# Patient Record
Sex: FEMALE | Race: WHITE | NOT HISPANIC OR LATINO | Employment: FULL TIME | URBAN - METROPOLITAN AREA
[De-identification: names, ages, dates, MRNs, and addresses within clinical notes are randomized per-mention and may not be internally consistent; named-entity substitution may affect disease eponyms.]

---

## 2017-08-03 ENCOUNTER — OFFICE VISIT (OUTPATIENT)
Dept: FAMILY MEDICINE | Facility: CLINIC | Age: 52
End: 2017-08-03
Payer: COMMERCIAL

## 2017-08-03 VITALS
HEIGHT: 64 IN | BODY MASS INDEX: 28.19 KG/M2 | WEIGHT: 165.13 LBS | DIASTOLIC BLOOD PRESSURE: 76 MMHG | OXYGEN SATURATION: 97 % | TEMPERATURE: 99 F | HEART RATE: 83 BPM | SYSTOLIC BLOOD PRESSURE: 124 MMHG

## 2017-08-03 DIAGNOSIS — Z00.00 ANNUAL PHYSICAL EXAM: ICD-10-CM

## 2017-08-03 DIAGNOSIS — E55.9 VITAMIN D DEFICIENCY: ICD-10-CM

## 2017-08-03 DIAGNOSIS — K58.0 IRRITABLE BOWEL SYNDROME WITH DIARRHEA: Primary | ICD-10-CM

## 2017-08-03 PROCEDURE — 3008F BODY MASS INDEX DOCD: CPT | Mod: S$GLB,,, | Performed by: FAMILY MEDICINE

## 2017-08-03 PROCEDURE — 99999 PR PBB SHADOW E&M-EST. PATIENT-LVL III: CPT | Mod: PBBFAC,,, | Performed by: FAMILY MEDICINE

## 2017-08-03 PROCEDURE — 99214 OFFICE O/P EST MOD 30 MIN: CPT | Mod: S$GLB,,, | Performed by: FAMILY MEDICINE

## 2017-08-03 RX ORDER — ERGOCALCIFEROL 1.25 MG/1
CAPSULE ORAL
Refills: 0 | Status: CANCELLED | OUTPATIENT
Start: 2017-08-03

## 2017-08-03 NOTE — PROGRESS NOTES
Answers for HPI/ROS submitted by the patient on 8/3/2017   Abdominal pain  Chronicity: chronic  Onset: more than 1 year ago  Onset quality: undetermined  Frequency: constantly  Episode duration: 12 months  Progression since onset: gradually worsening  Pain location: RUQ  Pain - numeric: 7/10  Pain quality: dull  anorexia: Yes  arthralgias: Yes  belching: Yes  constipation: Yes  diarrhea: Yes  dysuria: No  fever: No  flatus: Yes  frequency: No  headaches: Yes  hematochezia: Yes  hematuria: No  melena: No  myalgias: No  nausea: No  weight loss: Yes  vomiting: No  Aggravated by: eating  Relieved by: nothing  Pain severity: moderate  Treatments tried: nothing  Improvement on treatment: no relief  abdominal surgery: No  colon cancer: No  Crohn's disease: No  gallstones: No  GERD: No  irritable bowel syndrome: No  kidney stones: No  pancreatitis: No  PUD: Yes  ulcerative colitis: No  UTI: Yes  Answers for HPI/ROS submitted by the patient on 8/3/2017   Abdominal pain  Chronicity: chronic  Onset: more than 1 year ago  Onset quality: undetermined  Frequency: constantly  Episode duration: 12 months  Progression since onset: gradually worsening  Pain location: RUQ  Pain - numeric: 7/10  Pain quality: dull  anorexia: Yes  arthralgias: Yes  belching: Yes  constipation: Yes  diarrhea: Yes  dysuria: No  fever: No  flatus: Yes  frequency: No  headaches: Yes  hematochezia: Yes  hematuria: No  melena: No  myalgias: No  nausea: No  weight loss: Yes  vomiting: No  Aggravated by: eating  Relieved by: nothing  Pain severity: moderate  Treatments tried: nothing  Improvement on treatment: no relief  abdominal surgery: No  colon cancer: No  Crohn's disease: No  gallstones: No  GERD: No  irritable bowel syndrome: No  kidney stones: No  pancreatitis: No  PUD: Yes  ulcerative colitis: No  UTI: Yes

## 2017-08-03 NOTE — PROGRESS NOTES
Subjective:       Patient ID: Ashly Bianchi is a 52 y.o. female.    Chief Complaint: Breast Pain radiating towards back (right) and Discuss Head Pain    Patient presents for concerns about RUQ abdominal discomfort that she has had for years and it radiates to the back. She has no nausea or vomiting. She states she has it now, but she just finished eating. She states she is not sure whether it starts in the front or the back. She has no changes in her urine. She has had her colonoscopy with Dr. Mcgregor and it only showed diverticulosis. She had an EGD as well and was told that she has an ulcer. She states eating worsens this discomfort. She states it is not unbearable. She states it is a constant discomfort that is always there. She states her bowel movements are always soft or she is constipated. She states her bowel movements are soft now. She has no nausea or vomiting with this.       Abdominal Pain   This is a chronic problem. The current episode started more than 1 year ago. The onset quality is undetermined. The problem occurs constantly. The most recent episode lasted 12 months. The problem has been gradually worsening. The pain is located in the RUQ. The pain is at a severity of 7/10. The pain is moderate. The quality of the pain is dull. Associated symptoms include anorexia, arthralgias, belching, constipation, diarrhea, flatus, headaches, hematochezia and weight loss. Pertinent negatives include no dysuria, fever, frequency, hematuria, melena, myalgias, nausea or vomiting. The pain is aggravated by eating. The pain is relieved by nothing. She has tried nothing for the symptoms. The treatment provided no relief. Her past medical history is significant for PUD. There is no history of abdominal surgery, colon cancer, Crohn's disease, gallstones, GERD, irritable bowel syndrome, pancreatitis or ulcerative colitis. Patient's medical history includes UTI. Patient's medical history does not include kidney stones.  "    Review of Systems   Constitutional: Positive for weight loss. Negative for fever.   Gastrointestinal: Positive for abdominal pain, anorexia, constipation, diarrhea, flatus and hematochezia. Negative for melena, nausea and vomiting.   Genitourinary: Negative for dysuria, frequency and hematuria.   Musculoskeletal: Positive for arthralgias. Negative for myalgias.   Neurological: Positive for headaches.       Objective:       Vitals:    08/03/17 1439   BP: 124/76   Pulse: 83   Temp: 98.7 °F (37.1 °C)   TempSrc: Oral   SpO2: 97%   Weight: 74.9 kg (165 lb 2 oz)   Height: 5' 4" (1.626 m)       Physical Exam   Constitutional: She is oriented to person, place, and time. She appears well-developed and well-nourished. No distress.   HENT:   Head: Normocephalic and atraumatic.   Neck: Normal range of motion. Neck supple.   Cardiovascular: Normal rate, regular rhythm and normal heart sounds.  Exam reveals no gallop and no friction rub.    No murmur heard.  Pulmonary/Chest: Effort normal and breath sounds normal. No respiratory distress. She has no wheezes. She has no rales.   Abdominal: Soft. Bowel sounds are normal. She exhibits no distension and no mass. There is no tenderness. There is no rebound and no guarding. No hernia.   Neurological: She is alert and oriented to person, place, and time.   Skin: She is not diaphoretic.   Psychiatric: She has a normal mood and affect.       Assessment:       1. Irritable bowel syndrome with diarrhea    2. Annual physical exam    3. Vitamin D deficiency        Plan:       Ashly was seen today for breast pain radiating towards back (right) and discuss head pain.    Diagnoses and all orders for this visit:    Irritable bowel syndrome with diarrhea  -     eluxadoline (VIBERZI) 100 mg Tab; Take 100 mg by mouth 2 (two) times daily.  Due to the chronicity of her discomfort and her normal labs and normal colonoscopy will empirically treat for IBS.     Annual physical exam  -     " Comprehensive metabolic panel; Future  -     Lipid panel; Future  -     TSH; Future  -     CBC auto differential; Future  She is also due for routine labs    Vitamin D deficiency  -     Vitamin D; Future    Other orders  -     Cancel: ergocalciferol (VITAMIN D2) 50,000 unit Cap;

## 2018-11-20 ENCOUNTER — OFFICE VISIT (OUTPATIENT)
Dept: FAMILY MEDICINE | Facility: CLINIC | Age: 53
End: 2018-11-20
Payer: COMMERCIAL

## 2018-11-20 ENCOUNTER — OFFICE VISIT (OUTPATIENT)
Dept: CARDIOLOGY | Facility: CLINIC | Age: 53
End: 2018-11-20
Payer: COMMERCIAL

## 2018-11-20 ENCOUNTER — HOSPITAL ENCOUNTER (OUTPATIENT)
Dept: CARDIOLOGY | Facility: HOSPITAL | Age: 53
Discharge: HOME | End: 2018-11-20
Attending: INTERNAL MEDICINE
Payer: COMMERCIAL

## 2018-11-20 VITALS — HEIGHT: 64 IN | WEIGHT: 163 LBS | HEART RATE: 70 BPM | BODY MASS INDEX: 27.83 KG/M2

## 2018-11-20 VITALS
HEIGHT: 64 IN | DIASTOLIC BLOOD PRESSURE: 85 MMHG | OXYGEN SATURATION: 98 % | WEIGHT: 163.13 LBS | SYSTOLIC BLOOD PRESSURE: 134 MMHG | HEART RATE: 83 BPM | BODY MASS INDEX: 27.85 KG/M2

## 2018-11-20 VITALS
HEART RATE: 82 BPM | BODY MASS INDEX: 27.96 KG/M2 | TEMPERATURE: 98 F | HEIGHT: 64 IN | SYSTOLIC BLOOD PRESSURE: 110 MMHG | OXYGEN SATURATION: 99 % | WEIGHT: 163.81 LBS | DIASTOLIC BLOOD PRESSURE: 70 MMHG

## 2018-11-20 DIAGNOSIS — M79.605 PAIN IN BOTH LOWER EXTREMITIES: ICD-10-CM

## 2018-11-20 DIAGNOSIS — R07.89 CHEST PAIN, ATYPICAL: ICD-10-CM

## 2018-11-20 DIAGNOSIS — R00.2 PALPITATIONS: ICD-10-CM

## 2018-11-20 DIAGNOSIS — R07.89 CHEST DISCOMFORT: ICD-10-CM

## 2018-11-20 DIAGNOSIS — R53.83 FATIGUE, UNSPECIFIED TYPE: ICD-10-CM

## 2018-11-20 DIAGNOSIS — M25.562 ACUTE PAIN OF BOTH KNEES: ICD-10-CM

## 2018-11-20 DIAGNOSIS — Z72.0 TOBACCO ABUSE: Primary | ICD-10-CM

## 2018-11-20 DIAGNOSIS — E89.0 HISTORY OF PARTIAL THYROIDECTOMY: ICD-10-CM

## 2018-11-20 DIAGNOSIS — R63.1 POLYDIPSIA: ICD-10-CM

## 2018-11-20 DIAGNOSIS — M25.561 ACUTE PAIN OF BOTH KNEES: ICD-10-CM

## 2018-11-20 DIAGNOSIS — E55.9 HYPOVITAMINOSIS D: ICD-10-CM

## 2018-11-20 DIAGNOSIS — I83.813 VARICOSE VEINS OF BOTH LOWER EXTREMITIES WITH PAIN: ICD-10-CM

## 2018-11-20 DIAGNOSIS — M79.604 PAIN IN BOTH LOWER EXTREMITIES: ICD-10-CM

## 2018-11-20 DIAGNOSIS — R94.31 ABNORMAL EKG: ICD-10-CM

## 2018-11-20 LAB
AORTIC ROOT ANNULUS: 2.2 CM
AORTIC VALVE CUSP SEPERATION: 1.64 CM
ASCENDING AORTA: 2.81 CM
AV MEAN GRADIENT: 5.66 MMHG
AV PEAK GRADIENT: 10.63 MMHG
AV VALVE AREA: 1.91 CM2
BSA FOR ECHO PROCEDURE: 1.83 M2
CV ECHO LV RWT: 0.8 CM
CV STRESS BASE HR: 88
DIASTOLIC BLOOD PRESSURE: 82
DOP CALC AO PEAK VEL: 1.63 M/S
DOP CALC AO VTI: 33.51 CM
DOP CALC LVOT AREA: 2.86 CM2
DOP CALC LVOT DIAMETER: 1.91 CM
DOP CALC LVOT STROKE VOLUME: 64.03 CM3
DOP CALCLVOT PEAK VEL VTI: 22.36 CM
E WAVE DECELERATION TIME: 181.03 MSEC
E/A RATIO: 0.75
E/E' RATIO: 8.95
ECHO LV POSTERIOR WALL: 1.23 CM (ref 0.6–1.1)
FRACTIONAL SHORTENING: 35 % (ref 28–44)
INTERVENTRICULAR SEPTUM: 1.02 CM (ref 0.6–1.1)
IVRT: 0.12 MSEC
LA MAJOR: 4.55 CM
LA MINOR: 4.65 CM
LA WIDTH: 3.83 CM
LEFT ATRIUM SIZE: 3.24 CM
LEFT ATRIUM VOLUME INDEX: 26.5 ML/M2
LEFT ATRIUM VOLUME: 48.51 CM3
LEFT INTERNAL DIMENSION IN SYSTOLE: 2 CM (ref 2.1–4)
LEFT VENTRICLE DIASTOLIC VOLUME INDEX: 20.05 ML/M2
LEFT VENTRICLE DIASTOLIC VOLUME: 36.69 ML
LEFT VENTRICLE MASS INDEX: 55.4 G/M2
LEFT VENTRICLE SYSTOLIC VOLUME INDEX: 6.9 ML/M2
LEFT VENTRICLE SYSTOLIC VOLUME: 12.71 ML
LEFT VENTRICULAR INTERNAL DIMENSION IN DIASTOLE: 3.06 CM (ref 3.5–6)
LEFT VENTRICULAR MASS: 101.33 G
LV LATERAL E/E' RATIO: 7.73
LV SEPTAL E/E' RATIO: 10.63
MV PEAK A VEL: 1.14 M/S
MV PEAK E VEL: 0.85 M/S
OHS CV CPX 1 MINUTE RECOVERY HEART RATE: 110 BPM
OHS CV CPX 85 PERCENT MAX PREDICTED HEART RATE MALE: 135
OHS CV CPX ESTIMATED METS: 8
OHS CV CPX MAX PREDICTED HEART RATE: 159
OHS CV CPX PATIENT IS FEMALE: 1
OHS CV CPX PATIENT IS MALE: 0
OHS CV CPX PEAK DIASTOLIC BLOOD PRESSURE: 80 MMHG
OHS CV CPX PEAK HEAR RATE: 150
OHS CV CPX PEAK RATE PRESSURE PRODUCT: ABNORMAL
OHS CV CPX PEAK SYSTOLIC BLOOD PRESSURE: 193
OHS CV CPX PERCENT MAX PREDICTED HEART RATE ACHIEVED: 94
OHS CV CPX PERCENT TARGET HEART RATE ACHIEVED: 106.38
OHS CV CPX RATE PRESSURE PRODUCT PRESENTING: ABNORMAL
OHS CV CPX TARGET HEART RATE: 141
PISA TR MAX VEL: 2.5 M/S
PULM VEIN S/D RATIO: 1.68
PV PEAK D VEL: 0.4 M/S
PV PEAK S VEL: 0.67 M/S
PV PEAK VELOCITY: 1.18 CM/S
RA MAJOR: 4.77 CM
RA PRESSURE: 3 MMHG
RA WIDTH: 3.12 CM
RIGHT VENTRICULAR END-DIASTOLIC DIMENSION: 2.61 CM
RV TISSUE DOPPLER FREE WALL SYSTOLIC VELOCITY 1 (APICAL 4 CHAMBER VIEW): 11.14 M/S
SINUS: 2.78 CM
STJ: 2.39 CM
STRESS ECHO POST EXERCISE DUR MIN: 6 MIN
STRESS ECHO POST EXERCISE DUR SEC: 46
SYSTOLIC BLOOD PRESSURE: 134
TDI LATERAL: 0.11
TDI SEPTAL: 0.08
TDI: 0.1
TR MAX PG: 25 MMHG
TRICUSPID ANNULAR PLANE SYSTOLIC EXCURSION: 1.89 CM
TV REST PULMONARY ARTERY PRESSURE: 28 MMHG

## 2018-11-20 PROCEDURE — 93320 DOPPLER ECHO COMPLETE: CPT

## 2018-11-20 PROCEDURE — 93351 STRESS TTE COMPLETE: CPT | Mod: 26,,, | Performed by: INTERNAL MEDICINE

## 2018-11-20 PROCEDURE — 93325 DOPPLER ECHO COLOR FLOW MAPG: CPT | Mod: 26,,, | Performed by: INTERNAL MEDICINE

## 2018-11-20 PROCEDURE — 99999 PR PBB SHADOW E&M-EST. PATIENT-LVL V: CPT | Mod: PBBFAC,,, | Performed by: PHYSICIAN ASSISTANT

## 2018-11-20 PROCEDURE — 93320 DOPPLER ECHO COMPLETE: CPT | Mod: 26,,, | Performed by: INTERNAL MEDICINE

## 2018-11-20 PROCEDURE — 93227 XTRNL ECG REC<48 HR R&I: CPT | Mod: ,,, | Performed by: INTERNAL MEDICINE

## 2018-11-20 PROCEDURE — 3008F BODY MASS INDEX DOCD: CPT | Mod: CPTII,S$GLB,, | Performed by: PHYSICIAN ASSISTANT

## 2018-11-20 PROCEDURE — 93010 ELECTROCARDIOGRAM REPORT: CPT | Mod: S$GLB,,, | Performed by: INTERNAL MEDICINE

## 2018-11-20 PROCEDURE — 99204 OFFICE O/P NEW MOD 45 MIN: CPT | Mod: 25,S$GLB,, | Performed by: INTERNAL MEDICINE

## 2018-11-20 PROCEDURE — 3008F BODY MASS INDEX DOCD: CPT | Mod: CPTII,S$GLB,, | Performed by: INTERNAL MEDICINE

## 2018-11-20 PROCEDURE — 93225 XTRNL ECG REC<48 HRS REC: CPT

## 2018-11-20 PROCEDURE — 99214 OFFICE O/P EST MOD 30 MIN: CPT | Mod: S$GLB,,, | Performed by: PHYSICIAN ASSISTANT

## 2018-11-20 PROCEDURE — 99999 PR PBB SHADOW E&M-EST. PATIENT-LVL III: CPT | Mod: PBBFAC,,, | Performed by: INTERNAL MEDICINE

## 2018-11-20 RX ORDER — ERGOCALCIFEROL 1.25 MG/1
CAPSULE ORAL
Refills: 0 | Status: CANCELLED | OUTPATIENT
Start: 2018-11-20

## 2018-11-20 RX ORDER — MELOXICAM 7.5 MG/1
7.5 TABLET ORAL DAILY
Qty: 30 TABLET | Refills: 1 | Status: SHIPPED | OUTPATIENT
Start: 2018-11-20 | End: 2019-05-30

## 2018-11-20 NOTE — PROGRESS NOTES
Subjective:       Patient ID: Ashly Bianchi is a 53 y.o. female with multiple medical diagnoses as listed in the medical history and problem list that presents for Fatigue and Leg Pain  .    Chief Complaint: Fatigue and Leg Pain      Fatigue   This is a new (had thyroid surgery 5 years ago and told did not need replacement ) problem. The current episode started more than 1 month ago (couple months ). The problem has been gradually worsening. Associated symptoms include fatigue. Pertinent negatives include no anorexia, joint swelling, numbness or rash. 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 She has tried nothing for the symptoms.   Leg Pain    The incident occurred 5 to 7 days ago (she was bare foot on slides and ladder at [employer]; she does wear flats alot ). There was no injury mechanism. The pain is present in the right foot and left foot (both calves ). The quality of the pain is described as aching (with occasionally tingling ). The pain has been intermittent (worse at night ) since onset. Associated symptoms include tingling. Pertinent negatives include no inability to bear weight, loss of motion or numbness. She has tried nothing for the symptoms.   Knee Pain    The incident occurred more than 1 week ago (chronic but worsened after fall in april l>r). Injury mechanism: history of OA, fell made left side worse  The pain is present in the left knee and right knee. The quality of the pain is described as aching. The pain has been intermittent since onset. Associated symptoms include tingling. Pertinent negatives include no inability to bear weight, loss of motion or numbness. Exacerbated by: stairs, crawling  Treatments tried: pennsaid from ortho      Review of Systems   Constitutional: Positive for fatigue.   Gastrointestinal: Negative for anorexia.   Endocrine: Positive for polydipsia. Negative for polyuria.  "  Musculoskeletal: Negative for joint swelling.        Foot bilaterally wears flats generally    Skin: Negative for rash.        Varicose veins    Neurological: Positive for tingling. Negative for numbness.   Psychiatric/Behavioral: Positive for sleep disturbance (waking up earlier ). The patient is nervous/anxious.          PAST MEDICAL HISTORY:  Past Medical History:   Diagnosis Date    Heart murmur        SOCIAL HISTORY:  Social History     Socioeconomic History    Marital status:      Spouse name: Not on file    Number of children: Not on file    Years of education: Not on file    Highest education level: Not on file   Social Needs    Financial resource strain: Not on file    Food insecurity - worry: Not on file    Food insecurity - inability: Not on file    Transportation needs - medical: Not on file    Transportation needs - non-medical: Not on file   Occupational History    Not on file   Tobacco Use    Smoking status: Current Every Day Smoker     Packs/day: 0.50    Smokeless tobacco: Never Used   Substance and Sexual Activity    Alcohol use: No    Drug use: No    Sexual activity: Yes     Partners: Male     Comment: vasectomy   Other Topics Concern    Not on file   Social History Narrative    teacher       ALLERGIES AND MEDICATIONS: updated and reviewed.  Review of patient's allergies indicates:  No Known Allergies  Current Outpatient Medications   Medication Sig Dispense Refill    albuterol sulfate (PROAIR HFA INHL) Inhale into the lungs.      PENNSAID 20 mg/gram /actuation(2 %) sopm APPLY TWO PUMPS TO THE AFFECTED KNEE(S) TWICE DAILY  4    meloxicam (MOBIC) 7.5 MG tablet Take 1 tablet (7.5 mg total) by mouth once daily. 30 tablet 1     No current facility-administered medications for this visit.          Objective:   /70   Pulse 82   Temp 98.2 °F (36.8 °C) (Oral)   Ht 5' 4" (1.626 m)   Wt 74.3 kg (163 lb 12.8 oz)   SpO2 99%   BMI 28.12 kg/m²      Physical Exam "   Constitutional: She is oriented to person, place, and time. No distress.   HENT:   Head: Normocephalic and atraumatic.   Eyes: Conjunctivae and EOM are normal.   Cardiovascular: Normal rate and regular rhythm.   Pulmonary/Chest: Effort normal and breath sounds normal. She has no wheezes.   Musculoskeletal: Normal range of motion.   Neurological: She is alert and oriented to person, place, and time.   Skin: Skin is warm and dry.   Varicose veins bilaterally    Psychiatric: Her mood appears anxious. Her speech is rapid and/or pressured.           Assessment:       1. Tobacco abuse    2. Fatigue, unspecified type    3. History of partial thyroidectomy    4. Hypovitaminosis D    5. Polydipsia    6. Pain in both lower extremities    7. Varicose veins of both lower extremities with pain    8. Acute pain of both knees    9. Chest discomfort    10. Abnormal EKG        Plan:       Tobacco abuse  -     Lipid panel; Future; Expected date: 11/20/2018  -     Complete PFT with bronchodilator; Future  -     X-Ray Chest PA And Lateral; Future; Expected date: 11/20/2018  -     Ambulatory referral to Smoking Cessation Program  -     IN OFFICE EKG 12-LEAD (to [place])  -     Ambulatory referral to Cardiology    Fatigue, unspecified type  -     CBC auto differential; Future; Expected date: 11/20/2018  -     Comprehensive metabolic panel; Future; Expected date: 11/20/2018  -     TSH; Future; Expected date: 11/21/2018  -     Hemoglobin A1c; Future; Expected date: 11/20/2018    History of partial thyroidectomy  -     TSH; Future; Expected date: 11/21/2018  -     T4, free; Future; Expected date: 11/20/2018  -     T3; Future; Expected date: 11/20/2018    Hypovitaminosis D  -     Vitamin D; Future; Expected date: 11/20/2018    Polydipsia  -     Hemoglobin A1c; Future; Expected date: 11/20/2018    Pain in both lower extremities  -     CK; Future; Expected date: 11/20/2018  -     Sedimentation rate; Future; Expected date: 11/20/2018  -      C-reactive protein; Future; Expected date: 11/20/2018    Varicose veins of both lower extremities with pain  -     Ambulatory referral to Vascular Surgery    Acute pain of both knees  -     meloxicam (MOBIC) 7.5 MG tablet; Take 1 tablet (7.5 mg total) by mouth once daily.  Dispense: 30 tablet; Refill: 1    Chest discomfort  -     IN OFFICE EKG 12-LEAD (to [place])  -     Ambulatory referral to Cardiology    Abnormal EKG  -     Ambulatory referral to Cardiology      -will contact with results                     No Follow-up on file.

## 2018-11-20 NOTE — PROGRESS NOTES
Subjective:    Patient ID:  Ashly Bianchi is a 53 y.o. female who presents for evaluation of Palpitations and Abnormal ECG      HPI     Referred by PA  Fatigue   This is a new (had thyroid surgery 5 years ago and told did not need replacement ) problem. The current episode started more than 1 month ago (couple months ). The problem has been gradually worsening. Associated symptoms include fatigue. Pertinent negatives include no anorexia, joint swelling, numbness or rash. 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 She has tried nothing for the symptoms.   Leg Pain    The incident occurred 5 to 7 days ago (she was bare foot on slides and ladder at [facility]; she does wear flats alot ). There was no injury mechanism. The pain is present in the right foot and left foot (both calves ). The quality of the pain is described as aching (with occasionally tingling ). The pain has been intermittent (worse at night ) since onset. Associated symptoms include tingling. Pertinent negatives include no inability to bear weight, loss of motion or numbness. She has tried nothing for the symptoms.   Knee Pain    The incident occurred more than 1 week ago (chronic but worsened after fall in april l>r). Injury mechanism: history of OA, fell made left side worse  The pain is present in the left knee and right knee. The quality of the pain is described as aching. The pain has been intermittent since onset. Associated symptoms include tingling. Pertinent negatives include no inability to bear weight, loss of motion or numbness. Exacerbated by: stairs, crawling  Treatments tried: pennsaid from ortho      Tobacco abuse  -     Lipid panel; Future; Expected date: 11/20/2018  -     Complete PFT with bronchodilator; Future  -     X-Ray Chest PA And Lateral; Future; Expected date: 11/20/2018  -     Ambulatory referral to Smoking Cessation Program  -     IN  OFFICE EKG 12-LEAD (to [place])  -     Ambulatory referral to Cardiology     Fatigue, unspecified type  -     CBC auto differential; Future; Expected date: 11/20/2018  -     Comprehensive metabolic panel; Future; Expected date: 11/20/2018  -     TSH; Future; Expected date: 11/21/2018  -     Hemoglobin A1c; Future; Expected date: 11/20/2018     History of partial thyroidectomy  -     TSH; Future; Expected date: 11/21/2018  -     T4, free; Future; Expected date: 11/20/2018  -     T3; Future; Expected date: 11/20/2018     Hypovitaminosis D  -     Vitamin D; Future; Expected date: 11/20/2018     Polydipsia  -     Hemoglobin A1c; Future; Expected date: 11/20/2018     Pain in both lower extremities  -     CK; Future; Expected date: 11/20/2018  -     Sedimentation rate; Future; Expected date: 11/20/2018  -     C-reactive protein; Future; Expected date: 11/20/2018     Varicose veins of both lower extremities with pain  -     Ambulatory referral to Vascular Surgery     Acute pain of both knees  -     meloxicam (MOBIC) 7.5 MG tablet; Take 1 tablet (7.5 mg total) by mouth once daily.  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   HENT: Negative for ear discharge.    Eyes: Negative for blurred vision.   Respiratory: Negative for hemoptysis.    Endocrine: Negative for polyphagia.   Hematologic/Lymphatic: Negative for adenopathy.   Skin: Negative for color change.   Musculoskeletal: Negative for joint swelling.   Neurological: Negative for brief paralysis.   Psychiatric/Behavioral: Negative for hallucinations.        Objective:    Physical Exam   Constitutional: She is oriented to person, place, and time. She appears well-developed  and well-nourished.   HENT:   Head: Normocephalic and atraumatic.   Eyes: Conjunctivae are normal. Pupils are equal, round, and reactive to light.   Neck: Normal range of motion. Neck supple.   Cardiovascular: Normal rate, normal heart sounds and intact distal pulses.   Pulmonary/Chest: Effort normal and breath sounds normal.   Abdominal: Soft. Bowel sounds are normal.   Musculoskeletal: Normal range of motion.   Neurological: She is alert and oriented to person, place, and time.   Skin: Skin is warm and dry.         Assessment:       1. Palpitations    2. Chest pain, atypical         Plan:       Holter for palpitations  Stress echo for CP and abnormal EKG

## 2018-11-20 NOTE — LETTER
November 20, 2018      [facility]  [address]  [place] [address]           [facility] - Cardiology  [address] [facility] [address]  [place] [address]  Phone: [phone]          Patient: Ashly Bianchi   MR Number: [id number]   YOB: 1965   Date of Visit: 11/20/2018       Dear Yary Hu:    Thank you for referring Ashly Bianchi to me for evaluation. Attached you will find relevant portions of my assessment and plan of care.    If you have questions, please do not hesitate to call me. I look forward to following Ashly Bianchi along with you.    Sincerely,    David Ortega MD    Enclosure  CC:  No Recipients    If you would like to receive this communication electronically, please contact [email or web address] or [phone] to request more information on [facility] access.    For providers and/or their staff who would like to refer a patient to [facility], please contact us through our one-stop-shop provider referral line, [facility], at [phone].    If you feel you have received this communication in error or would no longer like to receive these types of communications, please e-mail [email or web address]

## 2018-11-21 ENCOUNTER — HOSPITAL ENCOUNTER (OUTPATIENT)
Dept: RESPIRATORY THERAPY | Facility: HOSPITAL | Age: 53
Discharge: HOME | End: 2018-11-21
Attending: PHYSICIAN ASSISTANT
Payer: COMMERCIAL

## 2018-11-21 ENCOUNTER — TELEPHONE (OUTPATIENT)
Dept: FAMILY MEDICINE | Facility: CLINIC | Age: 53
End: 2018-11-21

## 2018-11-21 VITALS — RESPIRATION RATE: 18 BRPM | OXYGEN SATURATION: 96 % | HEART RATE: 82 BPM

## 2018-11-21 DIAGNOSIS — E55.9 HYPOVITAMINOSIS D: Primary | ICD-10-CM

## 2018-11-21 DIAGNOSIS — Z72.0 TOBACCO ABUSE: ICD-10-CM

## 2018-11-21 LAB
BRPFT: ABNORMAL
DLCO ADJ PRE: 19.85 ML/(MIN*MMHG) (ref 18.14–29.61)
DLCO SINGLE BREATH LLN: 18.14
DLCO SINGLE BREATH PRE REF: 83.1 %
DLCO SINGLE BREATH REF: 23.88
DLCOC SBVA LLN: 3.31
DLCOC SBVA PRE REF: 114.5 %
DLCOC SBVA REF: 4.81
DLCOC SINGLE BREATH LLN: 18.14
DLCOC SINGLE BREATH PRE REF: 83.1 %
DLCOC SINGLE BREATH REF: 23.88
DLCOVA LLN: 3.31
DLCOVA PRE REF: 114.5 %
DLCOVA PRE: 5.5 ML/(MIN*MMHG*L) (ref 3.31–6.31)
DLCOVA REF: 4.81
DLVAADJ PRE: 5.5 ML/(MIN*MMHG*L) (ref 3.31–6.31)
ERV LLN: 0.91
ERV REF: 0.91
ERVN2 LLN: 0.91
ERVN2 PRE REF: 87.2 %
ERVN2 PRE: 0.79 L (ref 0.91–0.91)
ERVN2 REF: 0.91
FEF 25 75 CHG: -3.5 %
FEF 25 75 LLN: 1.41
FEF 25 75 POST REF: 56.4 %
FEF 25 75 PRE REF: 58.4 %
FEF 25 75 REF: 2.58
FET100 CHG: -1.9 %
FEV1 CHG: -5.1 %
FEV1 FVC CHG: 0.3 %
FEV1 FVC LLN: 69
FEV1 FVC POST REF: 91.8 %
FEV1 FVC PRE REF: 91.5 %
FEV1 FVC REF: 80
FEV1 LLN: 2.08
FEV1 POST REF: 72.1 %
FEV1 PRE REF: 76 %
FEV1 REF: 2.69
FEV6 CHG: -4.5 %
FEV6 LLN: 2.71
FEV6 POST REF: 75.7 %
FEV6 POST: 2.57 L (ref 2.71–4.08)
FEV6 PRE REF: 79.3 %
FEV6 PRE: 2.69 L (ref 2.71–4.08)
FEV6 REF: 3.4
FRCN2 LLN: 1.88
FRCN2 PRE REF: 67.5 %
FRCN2 REF: 2.7
FRCPLETH LLN: 1.88
FRCPLETH REF: 2.7
FVC CHG: -5.4 %
FVC LLN: 2.62
FVC POST REF: 78.2 %
FVC PRE REF: 82.6 %
FVC REF: 3.38
IVC PRE: 2.33 L (ref 2.62–4.14)
IVC SINGLE BREATH LLN: 2.62
IVC SINGLE BREATH PRE REF: 69 %
IVC SINGLE BREATH REF: 3.38
MVV LLN: 85
MVV REF: 101
PEF CHG: -15.8 %
PEF LLN: 4.93
PEF POST REF: 60.3 %
PEF PRE REF: 71.6 %
PEF REF: 6.65
POST FEF 25 75: 1.46 L/S (ref 1.41–3.75)
POST FET 100: 9.66 SEC
POST FEV1 FVC: 73.49 % (ref 68.71–91.46)
POST FEV1: 1.94 L (ref 2.08–3.31)
POST FVC: 2.64 L (ref 2.62–4.14)
POST PEF: 4.01 L/S (ref 4.93–8.37)
PRE DLCO: 19.85 ML/(MIN*MMHG) (ref 18.14–29.61)
PRE FEF 25 75: 1.51 L/S (ref 1.41–3.75)
PRE FET 100: 9.86 SEC
PRE FEV1 FVC: 73.27 % (ref 68.71–91.46)
PRE FEV1: 2.05 L (ref 2.08–3.31)
PRE FRC N2: 1.82 L
PRE FVC: 2.79 L (ref 2.62–4.14)
PRE PEF: 4.76 L/S (ref 4.93–8.37)
RAW LLN: 3.06
RAW REF: 3.06
RV LLN: 1.22
RV REF: 1.8
RVN2 LLN: 1.22
RVN2 PRE REF: 36.7 %
RVN2 PRE: 0.66 L (ref 1.22–2.37)
RVN2 REF: 1.8
RVN2TLCN2 LLN: 27
RVN2TLCN2 PRE REF: 51.7 %
RVN2TLCN2 PRE: 19.1 % (ref 27.39–46.57)
RVN2TLCN2 REF: 37
RVTLC LLN: 27
RVTLC REF: 37
TLC LLN: 3.98
TLC REF: 4.97
TLCN2 LLN: 3.98
TLCN2 PRE REF: 69.5 %
TLCN2 PRE: 3.46 L (ref 3.98–5.96)
TLCN2 REF: 4.97
VA PRE: 3.61 L (ref 4.82–4.82)
VA SINGLE BREATH LLN: 4.82
VA SINGLE BREATH PRE REF: 74.9 %
VA SINGLE BREATH REF: 4.82
VC LLN: 2.62
VC REF: 3.38
VCMAXN2 LLN: 2.62
VCMAXN2 PRE REF: 82.6 %
VCMAXN2 PRE: 2.79 L (ref 2.62–4.14)
VCMAXN2 REF: 3.38

## 2018-11-21 PROCEDURE — 94060 EVALUATION OF WHEEZING: CPT

## 2018-11-21 PROCEDURE — 25000242 PHARM REV CODE 250 ALT 637 W/ HCPCS: Performed by: PHYSICIAN ASSISTANT

## 2018-11-21 RX ORDER — ERGOCALCIFEROL 1.25 MG/1
CAPSULE ORAL
Qty: 24 CAPSULE | Refills: 1 | Status: SHIPPED | OUTPATIENT
Start: 2018-11-21 | End: 2019-05-30 | Stop reason: ALTCHOICE

## 2018-11-21 RX ORDER — ALBUTEROL SULFATE 2.5 MG/.5ML
2.5 SOLUTION RESPIRATORY (INHALATION) ONCE
Status: COMPLETED | OUTPATIENT
Start: 2018-11-21 | End: 2018-11-21

## 2018-11-21 RX ADMIN — ALBUTEROL SULFATE 2.5 MG: 2.5 SOLUTION RESPIRATORY (INHALATION) at 09:11

## 2018-11-21 NOTE — TELEPHONE ENCOUNTER
----- Message from Dillon Enrique sent at 11/21/2018  9:09 AM CST -----  Contact: Linda from Radiology at [facility] [phone]  Linda from Radiology at [facility] called to speak to the staff. The patient has an appointment for an xray, but she also has a halter monitor on. She would like to know if it is ok, to still do the xray. The patient reports she can not take off the halter monitor until 4 this afternoon. Please call at your earliest convenience.

## 2018-11-23 ENCOUNTER — HOSPITAL ENCOUNTER (OUTPATIENT)
Dept: RADIOLOGY | Facility: HOSPITAL | Age: 53
Discharge: HOME | End: 2018-11-23
Attending: PHYSICIAN ASSISTANT
Payer: COMMERCIAL

## 2018-11-23 PROCEDURE — 71046 X-RAY EXAM CHEST 2 VIEWS: CPT | Mod: TC,FY

## 2018-11-23 PROCEDURE — 71046 X-RAY EXAM CHEST 2 VIEWS: CPT | Mod: 26,,, | Performed by: RADIOLOGY

## 2018-11-26 LAB
OHS CV EVENT MONITOR DAY: 1
OHS CV HOLTER LENGTH DECIMAL HOURS: 47.98
OHS CV HOLTER LENGTH HOURS: 23
OHS CV HOLTER LENGTH MINUTES: 59

## 2018-11-27 ENCOUNTER — PATIENT MESSAGE (OUTPATIENT)
Dept: VASCULAR SURGERY | Facility: CLINIC | Age: 53
End: 2018-11-27

## 2018-11-27 DIAGNOSIS — I83.819 VARICOSE VEINS OF LOWER EXTREMITY WITH PAIN, UNSPECIFIED LATERALITY: Primary | ICD-10-CM

## 2018-11-28 ENCOUNTER — TELEPHONE (OUTPATIENT)
Dept: VASCULAR SURGERY | Facility: CLINIC | Age: 53
End: 2018-11-28

## 2018-11-28 NOTE — TELEPHONE ENCOUNTER
----- Message from Lilibeth Proctor sent at 11/28/2018  1:52 PM CST -----  Contact: Self/ [phone]  Pt requesting callback from Nurse Jaja. Says she can only answer phone until 230pm. Thank you.      1410 Call back to patient who did not answer. Left voicemail explaining moved her u/s appointment as requested in her email to the office. If any problems to message office or call office.

## 2018-11-29 ENCOUNTER — OFFICE VISIT (OUTPATIENT)
Dept: CARDIOLOGY | Facility: CLINIC | Age: 53
End: 2018-11-29
Payer: COMMERCIAL

## 2018-11-29 ENCOUNTER — HOSPITAL ENCOUNTER (OUTPATIENT)
Dept: RADIOLOGY | Facility: HOSPITAL | Age: 53
Discharge: HOME | End: 2018-11-29
Attending: SURGERY
Payer: COMMERCIAL

## 2018-11-29 VITALS
SYSTOLIC BLOOD PRESSURE: 136 MMHG | BODY MASS INDEX: 27.48 KG/M2 | WEIGHT: 160.94 LBS | HEIGHT: 64 IN | HEART RATE: 81 BPM | OXYGEN SATURATION: 100 % | DIASTOLIC BLOOD PRESSURE: 73 MMHG

## 2018-11-29 DIAGNOSIS — I83.819 VARICOSE VEINS OF LOWER EXTREMITY WITH PAIN, UNSPECIFIED LATERALITY: ICD-10-CM

## 2018-11-29 DIAGNOSIS — R00.2 PALPITATIONS: ICD-10-CM

## 2018-11-29 DIAGNOSIS — R07.89 CHEST PAIN, ATYPICAL: Primary | ICD-10-CM

## 2018-11-29 PROCEDURE — 3008F BODY MASS INDEX DOCD: CPT | Mod: CPTII,S$GLB,, | Performed by: INTERNAL MEDICINE

## 2018-11-29 PROCEDURE — 93970 EXTREMITY STUDY: CPT | Mod: TC

## 2018-11-29 PROCEDURE — 93970 EXTREMITY STUDY: CPT | Mod: 26,,, | Performed by: RADIOLOGY

## 2018-11-29 PROCEDURE — 99213 OFFICE O/P EST LOW 20 MIN: CPT | Mod: S$GLB,,, | Performed by: INTERNAL MEDICINE

## 2018-11-29 PROCEDURE — 99999 PR PBB SHADOW E&M-EST. PATIENT-LVL III: CPT | Mod: PBBFAC,,, | Performed by: INTERNAL MEDICINE

## 2018-11-29 NOTE — PROGRESS NOTES
Subjective:    Patient ID:  Ashly Bianchi is a 53 y.o. female who presents for follow-up of Results      HPI     Referred by PA  Fatigue   This is a new (had thyroid surgery 5 years ago and told did not need replacement ) problem. The current episode started more than 1 month ago (couple months ). The problem has been gradually worsening. Associated symptoms include fatigue. Pertinent negatives include no anorexia, joint swelling, numbness or rash. 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 She has tried nothing for the symptoms.   Leg Pain    The incident occurred 5 to 7 days ago (she was bare foot on slides and ladder at [facility]; she does wear flats alot ). There was no injury mechanism. The pain is present in the right foot and left foot (both calves ). The quality of the pain is described as aching (with occasionally tingling ). The pain has been intermittent (worse at night ) since onset. Associated symptoms include tingling. Pertinent negatives include no inability to bear weight, loss of motion or numbness. She has tried nothing for the symptoms.   Knee Pain    The incident occurred more than 1 week ago (chronic but worsened after fall in april l>r). Injury mechanism: history of OA, fell made left side worse  The pain is present in the left knee and right knee. The quality of the pain is described as aching. The pain has been intermittent since onset. Associated symptoms include tingling. Pertinent negatives include no inability to bear weight, loss of motion or numbness. Exacerbated by: stairs, crawling  Treatments tried: pennsaid from ortho       Tobacco abuse  -     Lipid panel; Future; Expected date: 11/20/2018  -     Complete PFT with bronchodilator; Future  -     X-Ray Chest PA And Lateral; Future; Expected date: 11/20/2018  -     Ambulatory referral to Smoking Cessation Program  -     IN OFFICE EKG 12-LEAD (to  [facility])  -     Ambulatory referral to Cardiology     Fatigue, unspecified type  -     CBC auto differential; Future; Expected date: 11/20/2018  -     Comprehensive metabolic panel; Future; Expected date: 11/20/2018  -     TSH; Future; Expected date: 11/21/2018  -     Hemoglobin A1c; Future; Expected date: 11/20/2018     History of partial thyroidectomy  -     TSH; Future; Expected date: 11/21/2018  -     T4, free; Future; Expected date: 11/20/2018  -     T3; Future; Expected date: 11/20/2018     Hypovitaminosis D  -     Vitamin D; Future; Expected date: 11/20/2018     Polydipsia  -     Hemoglobin A1c; Future; Expected date: 11/20/2018     Pain in both lower extremities  -     CK; Future; Expected date: 11/20/2018  -     Sedimentation rate; Future; Expected date: 11/20/2018  -     C-reactive protein; Future; Expected date: 11/20/2018     Varicose veins of both lower extremities with pain  -     Ambulatory referral to Vascular Surgery     Acute pain of both knees  -     meloxicam (MOBIC) 7.5 MG tablet; Take 1 tablet (7.5 mg total) by mouth once daily.  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 The estimated ejection fraction is 60%  · Normal LV diastolic function.  · Normal right ventricular systolic function.  · Trace tricuspid regurgitation.  · Overall, the patient's exercise capacity was normal.  · The EKG portion of this study is negative for myocardial ischemia.  · The stress echo portion of this study is negative for myocardial ischemia.    Holter 11/20/18  · The predominant rhythm is normal sinus rhythm  · No significant ectopy or arrhythmias  · Incomplete diary with no significant episodes in correlation with  entries provided     Denies CP or SOB      Review of Systems   Constitution: Negative for decreased appetite.   HENT: Negative for ear discharge.    Eyes: Negative for blurred vision.   Respiratory: Negative for hemoptysis.    Endocrine: Negative for polyphagia.   Hematologic/Lymphatic: Negative for adenopathy.   Skin: Negative for color change.   Musculoskeletal: Negative for joint swelling.   Neurological: Negative for brief paralysis.   Psychiatric/Behavioral: Negative for hallucinations.        Objective:    Physical Exam   Constitutional: She is oriented to person, place, and time. She appears well-developed and well-nourished.   HENT:   Head: Normocephalic and atraumatic.   Eyes: Conjunctivae are normal. Pupils are equal, round, and reactive to light.   Neck: Normal range of motion. Neck supple.   Cardiovascular: Normal rate, normal heart sounds and intact distal pulses.   Pulmonary/Chest: Effort normal and breath sounds normal.   Abdominal: Soft. Bowel sounds are normal.   Musculoskeletal: Normal range of motion.   Neurological: She is alert and oriented to person, place, and time.   Skin: Skin is warm and dry.         Assessment:       1. Chest pain, atypical    2. Palpitations         Plan:       Cardiac stable  F/U PRN

## 2018-12-06 ENCOUNTER — OFFICE VISIT (OUTPATIENT)
Dept: VASCULAR SURGERY | Facility: CLINIC | Age: 53
End: 2018-12-06
Payer: COMMERCIAL

## 2018-12-06 VITALS
DIASTOLIC BLOOD PRESSURE: 64 MMHG | WEIGHT: 165 LBS | BODY MASS INDEX: 28.17 KG/M2 | HEIGHT: 64 IN | SYSTOLIC BLOOD PRESSURE: 114 MMHG

## 2018-12-06 DIAGNOSIS — I87.2 VENOUS INSUFFICIENCY OF BOTH LOWER EXTREMITIES: Primary | ICD-10-CM

## 2018-12-06 DIAGNOSIS — I83.813 VARICOSE VEINS OF BILATERAL LOWER EXTREMITIES WITH PAIN: ICD-10-CM

## 2018-12-06 PROCEDURE — 99999 PR PBB SHADOW E&M-EST. PATIENT-LVL III: CPT | Mod: PBBFAC,,, | Performed by: SURGERY

## 2018-12-06 PROCEDURE — 99244 OFF/OP CNSLTJ NEW/EST MOD 40: CPT | Mod: S$GLB,,, | Performed by: SURGERY

## 2018-12-06 NOTE — PROGRESS NOTES
Neil Delacruz MD [facility]                         12/06/2018    HPI:  Ashly Bianchi is a 53 y.o. female with   Patient Active Problem List   Diagnosis    UTI (lower urinary tract infection)    Microhematuria    Mixed incontinence urge and stress    Palpitations    Chest pain, atypical    being managed by PCP and specialists who is here today for evaluation of BLE pain, LLE varicose veins and RLE spider veins.  Patient states location is left thigh and posterior calf as well as anterior right thigh occurring for a few yrs.  Associated signs and symptoms include pain.  Quality is dull and severity is 4/10.  Symptoms began a few yrs ago.  Alleviating factors include none.  Worsening factors standing for long periods of time.  No vein surgery history.  No BLE edema.    no MI  no Stroke  Tobacco use: 1/2 ppd    Past Medical History:   Diagnosis Date    Heart murmur      Past Surgical History:   Procedure Laterality Date    co2 laser to cervix      thyriod Right     TONSILLECTOMY       Family History   Problem Relation Age of Onset    Diabetes Mother     Heart disease Mother     Ovarian cancer Mother     Diabetes Father     Heart disease Father     Diabetes Brother     Heart disease Brother     Diabetes Brother     Heart disease Brother     Breast cancer Paternal Aunt     Colon cancer Other     Ovarian cancer Other      Social History     Socioeconomic History    Marital status:      Spouse name: Not on file    Number of children: Not on file    Years of education: Not on file    Highest education level: Not on file   Social Needs    Financial resource strain: Not on file    Food insecurity - worry: Not on file    Food insecurity - inability: Not on file    Transportation needs - medical: Not on file    Transportation needs - non-medical: Not on file   Occupational History    Not on file   Tobacco Use    Smoking status:  Current Every Day Smoker     Packs/day: 0.50     Types: Cigarettes    Smokeless tobacco: Never Used   Substance and Sexual Activity    Alcohol use: No    Drug use: No    Sexual activity: Yes     Partners: Male     Comment: vasectomy   Other Topics Concern    Not on file   Social History Narrative    teacher       Current Outpatient Medications:     albuterol sulfate (PROAIR HFA INHL), Inhale into the lungs., Disp: , Rfl:     ergocalciferol (ERGOCALCIFEROL) 50,000 unit Cap, Twice weekly, Disp: 24 capsule, Rfl: 1    meloxicam (MOBIC) 7.5 MG tablet, Take 1 tablet (7.5 mg total) by mouth once daily., Disp: 30 tablet, Rfl: 1    PENNSAID 20 mg/gram /actuation(2 %) sopm, APPLY TWO PUMPS TO THE AFFECTED KNEE(S) TWICE DAILY, Disp: , Rfl: 4    REVIEW OF SYSTEMS:  General: No fevers or chills; ENT: No sore throat; Allergy and Immunology: no persistent infections; Hematological and Lymphatic: No history of bleeding or easy bruising; Endocrine: negative; Respiratory: no cough, shortness of breath, or wheezing; Cardiovascular: no chest pain or dyspnea on exertion; Gastrointestinal: no abdominal pain/back, change in bowel habits, or bloody stools; Genito-Urinary: no dysuria, trouble voiding, or hematuria; Musculoskeletal: negative; Neurological: no TIA or stroke symptoms; Psychiatric: no nervousness, anxiety or depression.    PHYSICAL EXAM:                General appearance:  Alert, well-appearing, and in no distress.  Oriented to person, place, and time                    Neurological: Normal speech, no focal findings noted; CN II - XII grossly intact. RLE with sensation to light touch, LLE with sensation to light touch.            Musculoskeletal: Digits/nail without cyanosis/clubbing.  Strength 5/5 BLE.                    Neck: Supple, no significant adenopathy, no carotid bruit can be auscultated                  Chest:  Clear to auscultation, no wheezes, rales or rhonchi, symmetric air entry. No use of accessory  muscles               Cardiac: Normal rate and regular rhythm, S1 and S2 normal            Abdomen: Soft, nontender, nondistended, no masses or organomegaly, no hernia     No rebound tenderness noted; bowel sounds normal     No groin adenopathy      Extremities:       2+ R DP pulse, 2+ L DP pulse     no RLE edema, no LLE edema    Skin: RLE with spider vein anterior thigh; LLE with varicose vein to thigh and posterior calf    LAB RESULTS:  No results found for: CBC  No results found for: LABPROT, INR  Lab Results   Component Value Date     06/18/2013    K 4.2 06/18/2013     06/18/2013    CO2 19 (L) 06/18/2013    GLU 85 06/18/2013    BUN 7 06/18/2013    CREATININE 0.8 06/18/2013    CALCIUM 9.4 06/18/2013    ANIONGAP 11 06/18/2013    EGFRNONAA >60 06/18/2013     Lab Results   Component Value Date    WBC 9.40 11/20/2018    RBC 5.62 (H) 11/20/2018    HGB 16.1 (H) 11/20/2018    HCT 47.1 11/20/2018    MCV 84 11/20/2018    MCH 28.6 11/20/2018    MCHC 34.2 11/20/2018    RDW 13.7 11/20/2018     11/20/2018    MPV 10.0 11/20/2018    GRAN 6.7 11/20/2018    GRAN 71.0 11/20/2018    LYMPH 1.7 11/20/2018    LYMPH 18.1 11/20/2018    MONO 0.6 11/20/2018    MONO 6.8 11/20/2018    EOS 0.3 11/20/2018    BASO 0.06 11/20/2018    EOSINOPHIL 3.5 11/20/2018    BASOPHIL 0.6 11/20/2018    DIFFMETHOD Automated 11/20/2018     .  Lab Results   Component Value Date    HGBA1C 5.2 11/20/2018       IMAGING:  All pertinent imaging has been reviewed and interpreted independently.    11/29/18:  Hemodynamically significant venous reflux (>500 ms) noted in the right and left greater saphenous veins, as above.    No evidence of DVT in the lower extremities.    IMP/PLAN:  53 y.o. female with   Patient Active Problem List   Diagnosis    UTI (lower urinary tract infection)    Microhematuria    Mixed incontinence urge and stress    Palpitations    Chest pain, atypical    being managed by PCP and specialists who is here today for  evaluation of BLE pain with varicose and spider veins.    -BLE venous reflux with varicose and spider veins causing pain - recommend compression with Rx stockings, elevation, dietary changes associated with water and sodium intake discussed at length with patient  -Explained that her BLE diffuse pain after active days is not likely due to venous reflux and may be more arthritic in nature - likely will not resolve with the above therapy  -RTC 3 mo for further evaluation - if varicose vein remains symptomatic despite medical therapy at that time, she may benefit from L GSV EVLT\    I spent 20 minutes evaluating this patient and greater than 50% of the time was spent counseling, coordinator care and discussing the plan of care.  All questions were answered and patient stated understanding with agreement with the above treatment plan.    Neil Delacruz MD VI  Vascular and Endovascular Surgery

## 2018-12-06 NOTE — LETTER
December 6, 2018      [facility]  [address]  [place] [address]           [facility]  [address] [facility] [address]  [facility] [address]  Phone: [phone]  Fax: [phone]          Patient: Ashly Bianchi   MR Number: [id number]   YOB: 1965   Date of Visit: 12/6/2018       Dear Yary Hu:    Thank you for referring Ashly Bianchi to me for evaluation. Attached you will find relevant portions of my assessment and plan of care.    If you have questions, please do not hesitate to call me. I look forward to following Ashly Bianchi along with you.    Sincerely,    Neil Delacruz MD    Enclosure  CC:  No Recipients    If you would like to receive this communication electronically, please contact [email or web address] or [phone] to request more information on [employer] access.    For providers and/or their staff who would like to refer a patient to [facility], please contact us through our one-stop-shop provider referral line, [facility] , at [phone].    If you feel you have received this communication in error or would no longer like to receive these types of communications, please e-mail [email or web address]

## 2019-05-30 ENCOUNTER — OFFICE VISIT (OUTPATIENT)
Dept: FAMILY MEDICINE | Facility: CLINIC | Age: 54
End: 2019-05-30
Payer: COMMERCIAL

## 2019-05-30 VITALS
BODY MASS INDEX: 29.66 KG/M2 | OXYGEN SATURATION: 98 % | WEIGHT: 173.75 LBS | HEIGHT: 64 IN | RESPIRATION RATE: 16 BRPM | SYSTOLIC BLOOD PRESSURE: 116 MMHG | DIASTOLIC BLOOD PRESSURE: 70 MMHG | TEMPERATURE: 98 F | HEART RATE: 90 BPM

## 2019-05-30 DIAGNOSIS — Z11.59 NEED FOR HEPATITIS C SCREENING TEST: ICD-10-CM

## 2019-05-30 DIAGNOSIS — M94.0 COSTOCHONDRITIS: ICD-10-CM

## 2019-05-30 DIAGNOSIS — R22.31 ARM MASS, RIGHT: Primary | ICD-10-CM

## 2019-05-30 PROCEDURE — 3008F PR BODY MASS INDEX (BMI) DOCUMENTED: ICD-10-PCS | Mod: CPTII,S$GLB,, | Performed by: PHYSICIAN ASSISTANT

## 2019-05-30 PROCEDURE — 99999 PR PBB SHADOW E&M-EST. PATIENT-LVL IV: ICD-10-PCS | Mod: PBBFAC,,, | Performed by: PHYSICIAN ASSISTANT

## 2019-05-30 PROCEDURE — 99213 OFFICE O/P EST LOW 20 MIN: CPT | Mod: S$GLB,,, | Performed by: PHYSICIAN ASSISTANT

## 2019-05-30 PROCEDURE — 99999 PR PBB SHADOW E&M-EST. PATIENT-LVL IV: CPT | Mod: PBBFAC,,, | Performed by: PHYSICIAN ASSISTANT

## 2019-05-30 PROCEDURE — 99213 PR OFFICE/OUTPT VISIT, EST, LEVL III, 20-29 MIN: ICD-10-PCS | Mod: S$GLB,,, | Performed by: PHYSICIAN ASSISTANT

## 2019-05-30 PROCEDURE — 3008F BODY MASS INDEX DOCD: CPT | Mod: CPTII,S$GLB,, | Performed by: PHYSICIAN ASSISTANT

## 2019-05-30 NOTE — PROGRESS NOTES
Subjective:       Patient ID: Ashly Bianchi is a 53 y.o. female with multiple medical diagnoses as listed in the medical history and problem list that presents for Mass (right arm above elbow)  .    Chief Complaint: Mass (right arm above elbow)      Mass   This is a new problem. The current episode started yesterday. The problem occurs constantly. The problem has been unchanged. Associated symptoms include arthralgias, chest pain (resolved it happened the night after her and her daughter lifted a mini fridge and lasted two days ), joint swelling and weakness. Pertinent negatives include no chills, fever, headaches, neck pain or vomiting. Associated symptoms comments: Right elbow pain for years but this is more in her antecubital fossa . Exacerbated by: radiates up arm shoulder  She has tried nothing for the symptoms.     Review of Systems   Constitutional: Negative for activity change, chills, fever and unexpected weight change.   HENT: Negative for hearing loss, rhinorrhea and trouble swallowing.    Eyes: Negative for discharge and visual disturbance.   Respiratory: Positive for chest tightness (resolved it happened the night after her and her daughter lifted a mini fridge and lasted two days ). Negative for wheezing.    Cardiovascular: Positive for chest pain (resolved it happened the night after her and her daughter lifted a mini fridge and lasted two days ). Negative for palpitations.   Gastrointestinal: Positive for constipation. Negative for blood in stool, diarrhea and vomiting.   Endocrine: Negative for polydipsia and polyuria.   Genitourinary: Positive for difficulty urinating and menstrual problem. Negative for dysuria and hematuria.   Musculoskeletal: Positive for arthralgias and joint swelling. Negative for neck pain.   Neurological: Positive for weakness. Negative for headaches.   Psychiatric/Behavioral: Negative for confusion and dysphoric mood.         PAST MEDICAL HISTORY:  Past Medical History:    Diagnosis Date    Heart murmur        SOCIAL HISTORY:  Social History     Socioeconomic History    Marital status:      Spouse name: Not on file    Number of children: Not on file    Years of education: Not on file    Highest education level: Not on file   Occupational History    Not on file   Social Needs    Financial resource strain: Not hard at all    Food insecurity:     Worry: Never true     Inability: Never true    Transportation needs:     Medical: No     Non-medical: No   Tobacco Use    Smoking status: Former Smoker     Packs/day: 0.50     Types: Cigarettes     Last attempt to quit: 2019     Years since quittin.1    Smokeless tobacco: Never Used   Substance and Sexual Activity    Alcohol use: No     Frequency: Monthly or less     Drinks per session: 1 or 2     Binge frequency: Never    Drug use: No    Sexual activity: Yes     Partners: Male     Comment: vasectomy   Lifestyle    Physical activity:     Days per week: 0 days     Minutes per session: 0 min    Stress: Only a little   Relationships    Social connections:     Talks on phone: More than three times a week     Gets together: Once a week     Attends Orthodoxy service: Not on file     Active member of club or organization: Yes     Attends meetings of clubs or organizations: 1 to 4 times per year     Relationship status:    Other Topics Concern    Not on file   Social History Narrative    teacher       ALLERGIES AND MEDICATIONS: updated and reviewed.  Review of patient's allergies indicates:  No Known Allergies  Current Outpatient Medications   Medication Sig Dispense Refill    albuterol sulfate (PROAIR HFA INHL) Inhale into the lungs.      aspirin/sod bicarb/citric acid (ESHA-SELTZER ORAL) Take by mouth.      PENNSAID 20 mg/gram /actuation(2 %) sopm APPLY TWO PUMPS TO THE AFFECTED KNEE(S) TWICE DAILY  4     No current facility-administered medications for this visit.          Objective:   /70   Pulse  "90   Temp 98.4 °F (36.9 °C) (Oral)   Resp 16   Ht 5' 4" (1.626 m)   Wt 78.8 kg (173 lb 11.6 oz)   LMP 05/22/2019   SpO2 98%   BMI 29.82 kg/m²      Physical Exam   Constitutional: She is oriented to person, place, and time. No distress.   Eyes: Conjunctivae and EOM are normal.   Cardiovascular: Normal rate and regular rhythm.   Pulmonary/Chest: Effort normal and breath sounds normal.   Musculoskeletal:        Arms:  Neurological: She is alert and oriented to person, place, and time.   Skin: Skin is warm. No erythema.           Assessment:       1. Arm mass, right    2. Need for hepatitis C screening test    3. Costochondritis        Plan:       Arm mass, right  -     US Soft Tissue Misc; Future; Expected date: 05/30/2019  Lipoma vs cyst  Ice and or heat  Ibuprofen    If persist, general surgery.     Need for hepatitis C screening test  -     Hepatitis C antibody; Future; Expected date: 05/30/2019    Costochondritis  Improved. Likely this as after she lifted and improved  If return, follow up with cardiology. She had negative work up            No follow-ups on file.  "

## 2019-06-03 ENCOUNTER — HOSPITAL ENCOUNTER (OUTPATIENT)
Dept: RADIOLOGY | Facility: HOSPITAL | Age: 54
Discharge: HOME | End: 2019-06-03
Attending: PHYSICIAN ASSISTANT
Payer: COMMERCIAL

## 2019-06-03 DIAGNOSIS — R22.31 ARM MASS, RIGHT: ICD-10-CM

## 2019-06-03 PROCEDURE — 76882 US EXTREMITY NON VASCULAR LIMITED RIGHT: ICD-10-PCS | Mod: 26,RT,, | Performed by: RADIOLOGY

## 2019-06-03 PROCEDURE — 76882 US LMTD JT/FCL EVL NVASC XTR: CPT | Mod: 26,RT,, | Performed by: RADIOLOGY

## 2019-06-03 PROCEDURE — 76882 US LMTD JT/FCL EVL NVASC XTR: CPT | Mod: TC,RT

## 2019-11-07 ENCOUNTER — PATIENT OUTREACH (OUTPATIENT)
Dept: ADMINISTRATIVE | Facility: OTHER | Age: 54
End: 2019-11-07

## 2019-11-11 ENCOUNTER — OFFICE VISIT (OUTPATIENT)
Dept: UROLOGY | Facility: CLINIC | Age: 54
End: 2019-11-11
Payer: COMMERCIAL

## 2019-11-11 VITALS
BODY MASS INDEX: 29.45 KG/M2 | WEIGHT: 172.5 LBS | HEIGHT: 64 IN | DIASTOLIC BLOOD PRESSURE: 74 MMHG | SYSTOLIC BLOOD PRESSURE: 116 MMHG

## 2019-11-11 DIAGNOSIS — N39.46 MIXED INCONTINENCE URGE AND STRESS: Primary | ICD-10-CM

## 2019-11-11 DIAGNOSIS — R31.29 MICROHEMATURIA: ICD-10-CM

## 2019-11-11 DIAGNOSIS — R33.9 INCOMPLETE BLADDER EMPTYING: ICD-10-CM

## 2019-11-11 DIAGNOSIS — N81.9 FEMALE GENITAL PROLAPSE, UNSPECIFIED TYPE: ICD-10-CM

## 2019-11-11 PROCEDURE — 99204 OFFICE O/P NEW MOD 45 MIN: CPT | Mod: S$GLB,,, | Performed by: UROLOGY

## 2019-11-11 PROCEDURE — 3008F BODY MASS INDEX DOCD: CPT | Mod: CPTII,S$GLB,, | Performed by: UROLOGY

## 2019-11-11 PROCEDURE — 99999 PR PBB SHADOW E&M-EST. PATIENT-LVL III: CPT | Mod: PBBFAC,,, | Performed by: UROLOGY

## 2019-11-11 PROCEDURE — 99999 PR PBB SHADOW E&M-EST. PATIENT-LVL III: ICD-10-PCS | Mod: PBBFAC,,, | Performed by: UROLOGY

## 2019-11-11 PROCEDURE — 99204 PR OFFICE/OUTPT VISIT, NEW, LEVL IV, 45-59 MIN: ICD-10-PCS | Mod: S$GLB,,, | Performed by: UROLOGY

## 2019-11-11 PROCEDURE — 3008F PR BODY MASS INDEX (BMI) DOCUMENTED: ICD-10-PCS | Mod: CPTII,S$GLB,, | Performed by: UROLOGY

## 2019-11-11 RX ORDER — OXYBUTYNIN CHLORIDE 5 MG/1
5 TABLET, EXTENDED RELEASE ORAL DAILY
Qty: 30 TABLET | Refills: 11 | Status: SHIPPED | OUTPATIENT
Start: 2019-11-11 | End: 2021-12-09

## 2019-11-11 NOTE — PROGRESS NOTES
"  Subjective:       Ashly Bianchi is a 54 y.o. female who is a new patient who was self-referred for evaluation of "difficulty urinating".      She was last seen 6/15 for UTI. Also reported UUI and SADIQ at that time. ERIC noted on bladder scan - 114cc at that time. She did not follow up after that until now.      She returns now with problems with urination. Sometimes slow stream and sometimes with strong urgency. She is voiding q4-5 hours and routinely holds her urine (). She also c/o vaginal bulge just noted last week. She reduced this after it was noted. +constipation. Still with uterus.    UA with RBCs today. +vaginal spotting since yesterday.       The following portions of the patient's history were reviewed and updated as appropriate: allergies, current medications, past family history, past medical history, past social history, past surgical history and problem list.    Review of Systems  Constitutional: no fever or chills  ENT: no nasal congestion or sore throat  Respiratory: no cough or shortness of breath  Cardiovascular: no chest pain or palpitations  Gastrointestinal: no nausea or vomiting, tolerating diet  Genitourinary: as per HPI  Hematologic/Lymphatic: no easy bruising or lymphadenopathy  Musculoskeletal: no arthralgias or myalgias  Skin: no rashes or lesions  Neurological: no seizures or tremors  Behavioral/Psych: no auditory or visual hallucinations        Objective:    Vitals: /74 (BP Location: Right arm, Patient Position: Sitting)   Ht 5' 4" (1.626 m)   Wt 78.3 kg (172 lb 8.2 oz)   BMI 29.61 kg/m²     Physical Exam   General: well developed, well nourished in no acute distress  Head: normocephalic, atraumatic  Neck: supple, trachea midline, no obvious enlargement of thyroid  HEENT: EOMI, mucus membranes moist, sclera anicteric, no hearing impairment  Lungs: symmetric expansion, non-labored breathing  Cardiovascular: regular rate and rhythm, normal pulses  Abdomen: soft, " non tender, non distended, no palpable masses, no hepatosplenomegaly, no hernias, no CVA tenderness  Musculoskeletal: no peripheral edema, normal ROM in bilateral upper and lower extremities  Lymphatics: no cervical or inguinal lymphadenopathy  Skin: no rashes or lesions  Neuro: alert and oriented x 3, no gross deficits  Psych: normal judgment and insight, normal mood/affect and non-anxious  Genitourinary:   patient declined exam      Lab Review   Urine analysis today in clinic shows positive for red blood cells 250    Lab Results   Component Value Date    WBC 11.7 (H) 06/13/2019    HGB 15.0 06/13/2019    HCT 43.7 06/13/2019    MCV 84 11/20/2018     06/13/2019     Lab Results   Component Value Date    CREATININE 0.8 06/18/2013    BUN 7 06/18/2013       Imaging  NA         Assessment/Plan:      1. Mixed incontinence urge and stress    - Discussed difference of UUI and SADIQ components. Reviewed etiology and workup of each.   - SADIQ: Kegels, PFPT, pessary, bulking agent, MUS.   - UUI: Behavioral changes, PFPT, anticholinergics, mirabegron. Botox/InterStim for refractory UUI.   - Mild ERIC - monitor   - Ditropan 5mg, discussed SE.      2. Female genital prolapse, unspecified type    - Referral to urogyn     3. Microhematuria    - +vaginal spotting   - Recheck in 2 months      4. Incomplete bladder emptying    - Slight elevated PVR   - Monitor   - May be worsened by POP         Follow up in 2 months

## 2020-07-28 LAB
HPV16 DNA CVX QL PROBE+SIG AMP: NORMAL
PAP SMEAR: NORMAL

## 2021-12-09 ENCOUNTER — OFFICE VISIT (OUTPATIENT)
Dept: FAMILY MEDICINE | Facility: CLINIC | Age: 56
End: 2021-12-09
Payer: COMMERCIAL

## 2021-12-09 VITALS
WEIGHT: 169.31 LBS | TEMPERATURE: 98 F | BODY MASS INDEX: 28.91 KG/M2 | HEIGHT: 64 IN | DIASTOLIC BLOOD PRESSURE: 70 MMHG | RESPIRATION RATE: 16 BRPM | SYSTOLIC BLOOD PRESSURE: 118 MMHG | HEART RATE: 84 BPM | OXYGEN SATURATION: 97 %

## 2021-12-09 DIAGNOSIS — N39.46 MIXED INCONTINENCE URGE AND STRESS: ICD-10-CM

## 2021-12-09 DIAGNOSIS — Z12.11 SCREENING FOR COLON CANCER: ICD-10-CM

## 2021-12-09 DIAGNOSIS — R55 VASOVAGAL SYNCOPE: Primary | ICD-10-CM

## 2021-12-09 PROBLEM — Z82.49 FAMILY HISTORY OF CARDIOVASCULAR DISEASE: Status: ACTIVE | Noted: 2021-07-27

## 2021-12-09 PROBLEM — K21.9 GASTROESOPHAGEAL REFLUX DISEASE WITHOUT ESOPHAGITIS: Status: ACTIVE | Noted: 2021-05-20

## 2021-12-09 PROBLEM — R94.31 ABNORMAL EKG: Status: ACTIVE | Noted: 2021-05-20

## 2021-12-09 PROBLEM — R07.89 CHEST PAIN, ATYPICAL: Status: RESOLVED | Noted: 2018-11-20 | Resolved: 2021-12-09

## 2021-12-09 PROBLEM — M54.2 NECK PAIN: Status: ACTIVE | Noted: 2021-07-27

## 2021-12-09 PROBLEM — R00.2 PALPITATIONS: Status: RESOLVED | Noted: 2018-11-20 | Resolved: 2021-12-09

## 2021-12-09 PROCEDURE — 99214 OFFICE O/P EST MOD 30 MIN: CPT | Mod: S$GLB,,, | Performed by: NURSE PRACTITIONER

## 2021-12-09 PROCEDURE — 99214 PR OFFICE/OUTPT VISIT, EST, LEVL IV, 30-39 MIN: ICD-10-PCS | Mod: S$GLB,,, | Performed by: NURSE PRACTITIONER

## 2021-12-09 PROCEDURE — 99999 PR PBB SHADOW E&M-EST. PATIENT-LVL IV: CPT | Mod: PBBFAC,,, | Performed by: NURSE PRACTITIONER

## 2021-12-09 PROCEDURE — 99999 PR PBB SHADOW E&M-EST. PATIENT-LVL IV: ICD-10-PCS | Mod: PBBFAC,,, | Performed by: NURSE PRACTITIONER

## 2021-12-09 RX ORDER — ASPIRIN 81 MG/1
81 TABLET ORAL DAILY
COMMUNITY

## 2021-12-09 RX ORDER — NITROGLYCERIN 0.4 MG/1
0.4 TABLET SUBLINGUAL
COMMUNITY
Start: 2021-05-20

## 2021-12-10 ENCOUNTER — TELEPHONE (OUTPATIENT)
Dept: UROGYNECOLOGY | Facility: CLINIC | Age: 56
End: 2021-12-10
Payer: COMMERCIAL

## 2021-12-15 ENCOUNTER — PATIENT OUTREACH (OUTPATIENT)
Dept: ADMINISTRATIVE | Facility: HOSPITAL | Age: 56
End: 2021-12-15
Payer: COMMERCIAL

## 2021-12-20 ENCOUNTER — TELEPHONE (OUTPATIENT)
Dept: INTERNAL MEDICINE | Facility: CLINIC | Age: 56
End: 2021-12-20
Payer: COMMERCIAL

## 2022-07-05 ENCOUNTER — PATIENT MESSAGE (OUTPATIENT)
Dept: FAMILY MEDICINE | Facility: CLINIC | Age: 57
End: 2022-07-05
Payer: COMMERCIAL

## 2022-07-05 DIAGNOSIS — Z12.11 COLON CANCER SCREENING: Primary | ICD-10-CM

## 2022-07-05 DIAGNOSIS — Z12.11 SPECIAL SCREENING FOR MALIGNANT NEOPLASMS, COLON: Primary | ICD-10-CM

## 2022-07-05 RX ORDER — POLYETHYLENE GLYCOL 3350, SODIUM SULFATE ANHYDROUS, SODIUM BICARBONATE, SODIUM CHLORIDE, POTASSIUM CHLORIDE 236; 22.74; 6.74; 5.86; 2.97 G/4L; G/4L; G/4L; G/4L; G/4L
4 POWDER, FOR SOLUTION ORAL ONCE
Qty: 4000 ML | Refills: 0 | Status: SHIPPED | OUTPATIENT
Start: 2022-07-05 | End: 2022-07-05

## 2022-07-06 DIAGNOSIS — Z01.812 PRE-PROCEDURE LAB EXAM: ICD-10-CM

## 2022-07-18 ENCOUNTER — ANESTHESIA EVENT (OUTPATIENT)
Dept: ENDOSCOPY | Facility: HOSPITAL | Age: 57
End: 2022-07-18
Payer: COMMERCIAL

## 2022-07-18 RX ORDER — LIDOCAINE HYDROCHLORIDE 10 MG/ML
1 INJECTION, SOLUTION EPIDURAL; INFILTRATION; INTRACAUDAL; PERINEURAL ONCE
Status: CANCELLED | OUTPATIENT
Start: 2022-07-18 | End: 2022-07-18

## 2022-07-19 ENCOUNTER — HOSPITAL ENCOUNTER (OUTPATIENT)
Facility: HOSPITAL | Age: 57
Discharge: HOME | End: 2022-07-19
Attending: STUDENT IN AN ORGANIZED HEALTH CARE EDUCATION/TRAINING PROGRAM | Admitting: STUDENT IN AN ORGANIZED HEALTH CARE EDUCATION/TRAINING PROGRAM
Payer: COMMERCIAL

## 2022-07-19 ENCOUNTER — ANESTHESIA (OUTPATIENT)
Dept: ENDOSCOPY | Facility: HOSPITAL | Age: 57
End: 2022-07-19
Payer: COMMERCIAL

## 2022-07-19 VITALS
DIASTOLIC BLOOD PRESSURE: 65 MMHG | OXYGEN SATURATION: 98 % | RESPIRATION RATE: 16 BRPM | HEART RATE: 73 BPM | TEMPERATURE: 98 F | SYSTOLIC BLOOD PRESSURE: 145 MMHG

## 2022-07-19 DIAGNOSIS — Z12.11 COLON CANCER SCREENING: ICD-10-CM

## 2022-07-19 LAB
CTP QC/QA: YES
SARS-COV-2 AG RESP QL IA.RAPID: NEGATIVE

## 2022-07-19 PROCEDURE — 37000009 HC ANESTHESIA EA ADD 15 MINS: Performed by: STUDENT IN AN ORGANIZED HEALTH CARE EDUCATION/TRAINING PROGRAM

## 2022-07-19 PROCEDURE — 45380 COLONOSCOPY AND BIOPSY: CPT | Mod: PT | Performed by: STUDENT IN AN ORGANIZED HEALTH CARE EDUCATION/TRAINING PROGRAM

## 2022-07-19 PROCEDURE — 63600175 PHARM REV CODE 636 W HCPCS: Performed by: NURSE ANESTHETIST, CERTIFIED REGISTERED

## 2022-07-19 PROCEDURE — 45380 COLONOSCOPY AND BIOPSY: CPT | Mod: PT,,, | Performed by: STUDENT IN AN ORGANIZED HEALTH CARE EDUCATION/TRAINING PROGRAM

## 2022-07-19 PROCEDURE — 88305 TISSUE EXAM BY PATHOLOGIST: ICD-10-PCS | Mod: 26,,, | Performed by: PATHOLOGY

## 2022-07-19 PROCEDURE — 25000003 PHARM REV CODE 250: Performed by: NURSE ANESTHETIST, CERTIFIED REGISTERED

## 2022-07-19 PROCEDURE — 88305 TISSUE EXAM BY PATHOLOGIST: CPT | Performed by: PATHOLOGY

## 2022-07-19 PROCEDURE — 27201012 HC FORCEPS, HOT/COLD, DISP: Performed by: STUDENT IN AN ORGANIZED HEALTH CARE EDUCATION/TRAINING PROGRAM

## 2022-07-19 PROCEDURE — 88305 TISSUE EXAM BY PATHOLOGIST: CPT | Mod: 26,,, | Performed by: PATHOLOGY

## 2022-07-19 PROCEDURE — 45380 PR COLONOSCOPY,BIOPSY: ICD-10-PCS | Mod: PT,,, | Performed by: STUDENT IN AN ORGANIZED HEALTH CARE EDUCATION/TRAINING PROGRAM

## 2022-07-19 PROCEDURE — 27200997: Performed by: STUDENT IN AN ORGANIZED HEALTH CARE EDUCATION/TRAINING PROGRAM

## 2022-07-19 PROCEDURE — 37000008 HC ANESTHESIA 1ST 15 MINUTES: Performed by: STUDENT IN AN ORGANIZED HEALTH CARE EDUCATION/TRAINING PROGRAM

## 2022-07-19 RX ORDER — SODIUM CHLORIDE, SODIUM LACTATE, POTASSIUM CHLORIDE, CALCIUM CHLORIDE 600; 310; 30; 20 MG/100ML; MG/100ML; MG/100ML; MG/100ML
INJECTION, SOLUTION INTRAVENOUS CONTINUOUS
Status: DISCONTINUED | OUTPATIENT
Start: 2022-07-19 | End: 2022-07-19 | Stop reason: HOSPADM

## 2022-07-19 RX ORDER — PROPOFOL 10 MG/ML
VIAL (ML) INTRAVENOUS
Status: DISCONTINUED | OUTPATIENT
Start: 2022-07-19 | End: 2022-07-19

## 2022-07-19 RX ORDER — LIDOCAINE HYDROCHLORIDE 20 MG/ML
INJECTION INTRAVENOUS
Status: DISCONTINUED | OUTPATIENT
Start: 2022-07-19 | End: 2022-07-19

## 2022-07-19 RX ORDER — PROPOFOL 10 MG/ML
INJECTION, EMULSION INTRAVENOUS
Status: COMPLETED
Start: 2022-07-19 | End: 2022-07-19

## 2022-07-19 RX ADMIN — PROPOFOL 50 MG: 10 INJECTION, EMULSION INTRAVENOUS at 02:07

## 2022-07-19 RX ADMIN — LIDOCAINE HYDROCHLORIDE 100 MG: 20 INJECTION, SOLUTION INTRAVENOUS at 02:07

## 2022-07-19 RX ADMIN — SODIUM CHLORIDE: 0.9 INJECTION, SOLUTION INTRAVENOUS at 02:07

## 2022-07-19 NOTE — TRANSFER OF CARE
Anesthesia Transfer of Care Note    Patient: Ashly Bianchi    Procedure(s) Performed: Procedure(s) (LRB):  COLONOSCOPY (N/A)    Patient location: GI    Anesthesia Type: MAC    Transport from OR: Transported from OR on room air with adequate spontaneous ventilation    Post pain: adequate analgesia    Post assessment: no apparent anesthetic complications and tolerated procedure well    Post vital signs: stable    Level of consciousness: awake    Nausea/Vomiting: no nausea/vomiting    Complications: none    Transfer of care protocol was followed      Last vitals:   Visit Vitals  /71 (BP Location: Left arm)   Pulse 90   Temp 36.5 °C (97.7 °F) (Tympanic)   Resp 12   SpO2 98%   Breastfeeding No

## 2022-07-19 NOTE — PROVATION PATIENT INSTRUCTIONS
Discharge Summary/Instructions after an Endoscopic Procedure  Patient Name: Ashly Bianchi  Patient MRN: [id number]  Patient YOB: 1965 Tuesday, July 19, 2022  Charline Clarke MD  Dear patient,  As a result of recent federal legislation (The Federal Cures Act), you may   receive lab or pathology results from your procedure in your [facility]   account before your physician is able to contact you. Your physician or   their representative will relay the results to you with their   recommendations at their soonest availability.  Thank you,  RESTRICTIONS:  During your procedure today, you received medications for sedation.  These   medications may affect your judgment, balance and coordination.  Therefore,   for 24 hours, you have the following restrictions:   - DO NOT drive a car, operate machinery, make legal/financial decisions,   sign important papers or drink alcohol.    ACTIVITY:  Today: no heavy lifting, straining or running due to procedural   sedation/anesthesia.  The following day: return to full activity including work.  DIET:  Eat and drink normally unless instructed otherwise.     TREATMENT FOR COMMON SIDE EFFECTS:  - Mild abdominal pain, nausea, belching, bloating or excessive gas:  rest,   eat lightly and use a heating pad.  - Sore Throat: treat with throat lozenges and/or gargle with warm salt   water.  - Because air was used during the procedure, expelling large amounts of air   from your rectum or belching is normal.  - If a bowel prep was taken, you may not have a bowel movement for 1-3 days.    This is normal.  SYMPTOMS TO WATCH FOR AND REPORT TO YOUR PHYSICIAN:  1. Abdominal pain or bloating, other than gas cramps.  2. Chest pain.  3. Back pain.  4. Signs of infection such as: chills or fever occurring within 24 hours   after the procedure.  5. Rectal bleeding, which would show as bright red, maroon, or black stools.   (A tablespoon of blood from the rectum is not serious, especially if    hemorrhoids are present.)  6. Vomiting.  7. Weakness or dizziness.  GO DIRECTLY TO THE NEAREST EMERGENCY ROOM IF YOU HAVE ANY OF THE FOLLOWING:      Difficulty breathing              Chills and/or fever over 101 F   Persistent vomiting and/or vomiting blood   Severe abdominal pain   Severe chest pain   Black, tarry stools   Bleeding- more than one tablespoon   Any other symptom or condition that you feel may need urgent attention  Your doctor recommends these additional instructions:  If any biopsies were taken, your doctors clinic will contact you in 1 to 2   weeks with any results.  - Discharge patient to home (ambulatory).   - Resume regular diet.   - Continue present medications.   - Await pathology results.   - Repeat colonoscopy for surveillance based on pathology results.   - Recommend psyllium fiber daily   - Recommend miralax daily  For questions, problems or results please call your physician - Charline Clarke MD at Work:  [phone].  [facility] can be reached at [phone]     IF A COMPLICATION OR EMERGENCY SITUATION ARISES AND YOU ARE UNABLE TO REACH   YOUR PHYSICIAN - GO DIRECTLY TO THE EMERGENCY ROOM.  Charline Clarke MD  7/19/2022 2:35:25 PM  This report has been verified and signed electronically.  Dear patient,  As a result of recent federal legislation (The Federal Cures Act), you may   receive lab or pathology results from your procedure in your [facility]   account before your physician is able to contact you. Your physician or   their representative will relay the results to you with their   recommendations at their soonest availability.  Thank you,  PROVATION

## 2022-07-19 NOTE — H&P
Short Stay Endoscopy History and Physical    PCP - Jazmin Vargas MD  Referring Physician - Melissa Nunez NP  [address]  [address]  [place] [address]    Procedure - Colonoscopy  ASA - per anesthesia  Mallampati - per anesthesia  History of Anesthesia problems - no  Family history Anesthesia problems -  no   Plan of anesthesia - General    HPI  57 y.o. female  Reason for procedure:   Screen for colon cancer [Z12.11]        ROS:  Constitutional: No fevers, chills, No weight loss  CV: No chest pain  Pulm: No cough, No shortness of breath  GI: see HPI    Medical History:  has a past medical history of Heart murmur.    Surgical History:  has a past surgical history that includes Tonsillectomy; co2 laser to cervix; and thyriod (Right).    Family History: family history includes Breast cancer in her paternal aunt; Colon cancer in her other; Diabetes in her brother, brother, father, and mother; Heart disease in her brother, brother, father, and mother; Ovarian cancer in her mother and other..    Social History:  reports that she has been smoking cigarettes. She has been smoking about 0.50 packs per day. She has never used smokeless tobacco. She reports that she does not drink alcohol and does not use drugs.    Review of patient's allergies indicates:  No Known Allergies    Medications:   Medications Prior to Admission   Medication Sig Dispense Refill Last Dose    aspirin (ECOTRIN) 81 MG EC tablet Take 81 mg by mouth once daily.       nitroGLYCERIN (NITROSTAT) 0.4 MG SL tablet Place 0.4 mg under the tongue.          Physical Exam:    Vital Signs: There were no vitals filed for this visit.    General Appearance: Well appearing in no acute distress  Abdomen: Soft, non tender, non distended with normal bowel sounds, no masses    Labs:  Lab Results   Component Value Date    WBC 11.7 (H) 06/13/2019    HGB 15.0 06/13/2019    HCT 43.7 06/13/2019     06/13/2019    CHOL 191 11/20/2018    TRIG 116  11/20/2018    HDL 40 11/20/2018    ALT 22 06/18/2013    AST 22 06/18/2013     06/18/2013    K 4.2 06/18/2013     06/18/2013    CREATININE 0.8 06/18/2013    BUN 7 06/18/2013    CO2 19 (L) 06/18/2013    TSH 1.477 11/20/2018    HGBA1C 5.2 11/20/2018       I have explained the risks and benefits of this endoscopic procedure to the patient including but not limited to bleeding, inflammation, infection, perforation, and death.    Assessment/Plan:     1. CRC Screening     - Proceed with colonoscopy       Charline Clarke MD  Gastroenterology   [facility]

## 2022-07-19 NOTE — ANESTHESIA PREPROCEDURE EVALUATION
07/19/2022  Ashly Bianchi is a 57 y.o., female.  To undergo Procedure(s) (LRB):  COLONOSCOPY (N/A)     Denies CP/SOB/MI/CVA/URI symptoms.  Occasional GERD with certain foods.  METS > 4  NPO > 8    Past Medical History:  Past Medical History:   Diagnosis Date    Heart murmur        Past Surgical History:  Past Surgical History:   Procedure Laterality Date    co2 laser to cervix      thyriod Right     TONSILLECTOMY         Social History:  Social History     Socioeconomic History    Marital status:    Tobacco Use    Smoking status: Current Every Day Smoker     Packs/day: 0.50     Types: Cigarettes     Last attempt to quit: 4/18/2019     Years since quitting: 3.2    Smokeless tobacco: Never Used   Substance and Sexual Activity    Alcohol use: No    Drug use: No    Sexual activity: Yes     Partners: Male     Comment: vasectomy   Social History Narrative    teacher     Social Determinants of Health     Financial Resource Strain: Low Risk     Difficulty of Paying Living Expenses: Not very hard   Food Insecurity: No Food Insecurity    Worried About Running Out of Food in the Last Year: Never true    Ran Out of Food in the Last Year: Never true   Transportation Needs: No Transportation Needs    Lack of Transportation (Medical): No    Lack of Transportation (Non-Medical): No   Physical Activity: Unknown    Days of Exercise per Week: 0 days   Stress: Stress Concern Present    Feeling of Stress : To some extent   Social Connections: Unknown    Frequency of Communication with Friends and Family: More than three times a week    Frequency of Social Gatherings with Friends and Family: Once a week    Active Member of Clubs or Organizations: Yes    Attends Club or Organization Meetings: 1 to 4 times per year    Marital Status:    Housing Stability: Low Risk     Unable to Pay for Housing  in the Last Year: No    Number of Places Lived in the Last Year: 1    Unstable Housing in the Last Year: No       Medications:  No current facility-administered medications on file prior to encounter.     Current Outpatient Medications on File Prior to Encounter   Medication Sig Dispense Refill    aspirin (ECOTRIN) 81 MG EC tablet Take 81 mg by mouth once daily.      nitroGLYCERIN (NITROSTAT) 0.4 MG SL tablet Place 0.4 mg under the tongue.         Allergies:  Review of patient's allergies indicates:  No Known Allergies    Active Problems:  Patient Active Problem List   Diagnosis    Microhematuria    Mixed incontinence urge and stress    Abnormal EKG    Family history of cardiovascular disease    Gastroesophageal reflux disease without esophagitis    Neck pain     24 Hour Vitals:  BP: ()/()   Arterial Line BP: ()/()    See Nursing Charting For Additional Vitals      Pre-op Assessment    I have reviewed the Patient Summary Reports.     I have reviewed the Nursing Notes.       Review of Systems  Anesthesia Hx:  No problems with previous Anesthesia   Denies Personal Hx of Anesthesia complications.   Social:  Smoker, No Alcohol Use    Cardiovascular:   Exercise tolerance: good Valvular problems/Murmurs    Pulmonary:  Pulmonary Normal    Hepatic/GI:   GERD, well controlled    Neurological:  Neurology Normal    Endocrine:  Endocrine Normal        Physical Exam  General: Well nourished    Airway:  Mallampati: II   Mouth Opening: Normal  TM Distance: Normal      Dental:  Intact    Chest/Lungs:  Clear to auscultation    Heart:  Rate: Normal  Rhythm: Regular Rhythm        Anesthesia Plan  Type of Anesthesia, risks & benefits discussed:    Anesthesia Type: Gen ETT, Gen Natural Airway, MAC  Intra-op Monitoring Plan: Standard ASA Monitors  Post Op Pain Control Plan: multimodal analgesia and IV/PO Opioids PRN  Induction:  IV  Informed Consent: Informed consent signed with the Patient and all parties understand the risks  and agree with anesthesia plan.  All questions answered.   ASA Score: 2    Ready For Surgery From Anesthesia Perspective.     .

## 2022-07-20 NOTE — ANESTHESIA POSTPROCEDURE EVALUATION
Anesthesia Post Evaluation    Patient: Ashly Bianchi    Procedure(s) Performed: Procedure(s) (LRB):  COLONOSCOPY (N/A)    Final Anesthesia Type: general      Patient location during evaluation: GI PACU  Patient participation: Yes- Able to Participate  Level of consciousness: awake and alert and oriented  Post-procedure vital signs: reviewed and stable  Pain management: adequate  Airway patency: patent    PONV status at discharge: No PONV  Anesthetic complications: no      Cardiovascular status: hemodynamically stable and blood pressure returned to baseline  Respiratory status: spontaneous ventilation, room air and unassisted  Hydration status: euvolemic  Follow-up not needed.          Vitals Value Taken Time   /65 07/19/22 1508   Temp 36.5 °C (97.7 °F) 07/19/22 1437   Pulse 73 07/19/22 1508   Resp 16 07/19/22 1508   SpO2 98 % 07/19/22 1508         Event Time   Out of Recovery 15:08:17         Pain/Caprice Score: Caprice Score: 10 (7/19/2022  3:08 PM)

## 2022-07-21 LAB
FINAL PATHOLOGIC DIAGNOSIS: NORMAL
GROSS: NORMAL
Lab: NORMAL

## 2022-09-20 DIAGNOSIS — Z12.31 OTHER SCREENING MAMMOGRAM: ICD-10-CM

## 2022-10-10 ENCOUNTER — PATIENT MESSAGE (OUTPATIENT)
Dept: FAMILY MEDICINE | Facility: CLINIC | Age: 57
End: 2022-10-10
Payer: COMMERCIAL